# Patient Record
Sex: MALE
[De-identification: names, ages, dates, MRNs, and addresses within clinical notes are randomized per-mention and may not be internally consistent; named-entity substitution may affect disease eponyms.]

---

## 2020-12-31 PROBLEM — Z00.00 ENCOUNTER FOR PREVENTIVE HEALTH EXAMINATION: Status: ACTIVE | Noted: 2020-12-31

## 2021-09-17 ENCOUNTER — RESULT REVIEW (OUTPATIENT)
Age: 71
End: 2021-09-17

## 2024-10-29 ENCOUNTER — EMERGENCY (EMERGENCY)
Facility: HOSPITAL | Age: 74
LOS: 0 days | Discharge: HOME | End: 2024-10-29
Attending: STUDENT IN AN ORGANIZED HEALTH CARE EDUCATION/TRAINING PROGRAM
Payer: MEDICARE

## 2024-10-29 ENCOUNTER — EMERGENCY (EMERGENCY)
Facility: HOSPITAL | Age: 74
LOS: 0 days | Discharge: HOME | End: 2024-10-29
Attending: STUDENT IN AN ORGANIZED HEALTH CARE EDUCATION/TRAINING PROGRAM

## 2024-10-29 VITALS
HEART RATE: 62 BPM | TEMPERATURE: 98 F | OXYGEN SATURATION: 97 % | DIASTOLIC BLOOD PRESSURE: 79 MMHG | RESPIRATION RATE: 18 BRPM | SYSTOLIC BLOOD PRESSURE: 132 MMHG

## 2024-10-29 VITALS
OXYGEN SATURATION: 97 % | RESPIRATION RATE: 15 BRPM | SYSTOLIC BLOOD PRESSURE: 125 MMHG | TEMPERATURE: 98 F | DIASTOLIC BLOOD PRESSURE: 73 MMHG | HEART RATE: 55 BPM

## 2024-10-29 VITALS
HEART RATE: 70 BPM | WEIGHT: 145.06 LBS | DIASTOLIC BLOOD PRESSURE: 84 MMHG | OXYGEN SATURATION: 98 % | SYSTOLIC BLOOD PRESSURE: 137 MMHG | HEIGHT: 66 IN | RESPIRATION RATE: 70 BRPM | TEMPERATURE: 98 F

## 2024-10-29 VITALS
HEIGHT: 66 IN | WEIGHT: 145.06 LBS | DIASTOLIC BLOOD PRESSURE: 82 MMHG | RESPIRATION RATE: 19 BRPM | OXYGEN SATURATION: 98 % | SYSTOLIC BLOOD PRESSURE: 130 MMHG | TEMPERATURE: 98 F | HEART RATE: 57 BPM

## 2024-10-29 DIAGNOSIS — K21.9 GASTRO-ESOPHAGEAL REFLUX DISEASE WITHOUT ESOPHAGITIS: ICD-10-CM

## 2024-10-29 DIAGNOSIS — R04.0 EPISTAXIS: ICD-10-CM

## 2024-10-29 DIAGNOSIS — E78.5 HYPERLIPIDEMIA, UNSPECIFIED: ICD-10-CM

## 2024-10-29 DIAGNOSIS — I10 ESSENTIAL (PRIMARY) HYPERTENSION: ICD-10-CM

## 2024-10-29 LAB
BASOPHILS # BLD AUTO: 0.04 K/UL — SIGNIFICANT CHANGE UP (ref 0–0.2)
BASOPHILS NFR BLD AUTO: 0.5 % — SIGNIFICANT CHANGE UP (ref 0–2)
EOSINOPHIL # BLD AUTO: 0.05 K/UL — SIGNIFICANT CHANGE UP (ref 0–0.5)
EOSINOPHIL NFR BLD AUTO: 0.7 % — SIGNIFICANT CHANGE UP (ref 0–6)
HCT VFR BLD CALC: 43.9 % — SIGNIFICANT CHANGE UP (ref 39–50)
HGB BLD-MCNC: 14.9 G/DL — SIGNIFICANT CHANGE UP (ref 13–17)
IMM GRANULOCYTES NFR BLD AUTO: 0.3 % — SIGNIFICANT CHANGE UP (ref 0–0.9)
LYMPHOCYTES # BLD AUTO: 1.41 K/UL — SIGNIFICANT CHANGE UP (ref 1–3.3)
LYMPHOCYTES # BLD AUTO: 19.2 % — SIGNIFICANT CHANGE UP (ref 13–44)
MCHC RBC-ENTMCNC: 30 PG — SIGNIFICANT CHANGE UP (ref 27–34)
MCHC RBC-ENTMCNC: 33.9 G/DL — SIGNIFICANT CHANGE UP (ref 32–36)
MCV RBC AUTO: 88.3 FL — SIGNIFICANT CHANGE UP (ref 80–100)
MONOCYTES # BLD AUTO: 0.41 K/UL — SIGNIFICANT CHANGE UP (ref 0–0.9)
MONOCYTES NFR BLD AUTO: 5.6 % — SIGNIFICANT CHANGE UP (ref 2–14)
NEUTROPHILS # BLD AUTO: 5.4 K/UL — SIGNIFICANT CHANGE UP (ref 1.8–7.4)
NEUTROPHILS NFR BLD AUTO: 73.7 % — SIGNIFICANT CHANGE UP (ref 43–77)
NRBC # BLD: 0 /100 WBCS — SIGNIFICANT CHANGE UP (ref 0–0)
PLATELET # BLD AUTO: 261 K/UL — SIGNIFICANT CHANGE UP (ref 150–400)
RBC # BLD: 4.97 M/UL — SIGNIFICANT CHANGE UP (ref 4.2–5.8)
RBC # FLD: 13.2 % — SIGNIFICANT CHANGE UP (ref 10.3–14.5)
WBC # BLD: 7.33 K/UL — SIGNIFICANT CHANGE UP (ref 3.8–10.5)
WBC # FLD AUTO: 7.33 K/UL — SIGNIFICANT CHANGE UP (ref 3.8–10.5)

## 2024-10-29 PROCEDURE — 99284 EMERGENCY DEPT VISIT MOD MDM: CPT | Mod: 25

## 2024-10-29 PROCEDURE — 99283 EMERGENCY DEPT VISIT LOW MDM: CPT | Mod: 25

## 2024-10-29 PROCEDURE — 30903 CONTROL OF NOSEBLEED: CPT | Mod: RT,59

## 2024-10-29 PROCEDURE — 30901 CONTROL OF NOSEBLEED: CPT | Mod: RT,59

## 2024-10-29 PROCEDURE — 71045 X-RAY EXAM CHEST 1 VIEW: CPT | Mod: 26

## 2024-10-29 RX ORDER — CEPHALEXIN 500 MG
1 CAPSULE ORAL
Qty: 10 | Refills: 0
Start: 2024-10-29 | End: 2024-11-02

## 2024-10-29 RX ORDER — SILVER NITRATE
1 CRYSTALS MISCELLANEOUS ONCE
Refills: 0 | Status: COMPLETED | OUTPATIENT
Start: 2024-10-29 | End: 2024-10-29

## 2024-10-29 RX ADMIN — Medication 1 APPLICATION(S): at 10:06

## 2024-10-29 NOTE — ED PROVIDER NOTE - CLINICAL SUMMARY MEDICAL DECISION MAKING FREE TEXT BOX
74-year-old male with history of hypertension, hyperlipidemia and GERD presents today accompanied with his wife for evaluation for epistaxis, pt reports that he was bending over to get something which the bleeding started, describes it running out like an open faucet associate with coughing up clots, pt states that it lasted x 20 minutes, he denies dizziness/lightheadedness, headaches, weakness, chest pains or palpitations,  On exam pt is awake, alert and oriented x 3, well appearing, has dried blood on his chin. pt's right nostril turbinate is mildy swollen, +anterior irriation to the upper aspect. Pt denies nosepicking, recent cold instructions, prior to coming in he did call his niece who is a physician and instruced to pinch the bridge of his nose and look down which did help control his symptoms, pt's nostril cauterized, placed on the monitor for bp monitoring, cbc to check blood count, Will reasses and dispo 74-year-old male with history of hypertension, hyperlipidemia and GERD presents today accompanied with his wife for evaluation for epistaxis, pt reports that he was bending over to get something which the bleeding started, describes it running out like an open faucet associate with coughing up clots, pt states that it lasted x 20 minutes, he denies dizziness/lightheadedness, headaches, weakness, chest pains or palpitations,  On exam pt is awake, alert and oriented x 3, well appearing, has dried blood on his chin. pt's right nostril turbinate is mildy swollen, +anterior irriation to the upper aspect. Pt denies nosepicking, recent cold instructions, prior to coming in he did call his niece who is a physician and instruced to pinch the bridge of his nose and look down which did help control his symptoms, pt's nostril cauterized, placed on the monitor for bp monitoring, cbc to check blood count, Will reasses and dispo    cbc ordered, H/H stable, Hbg-14, right nostril cauterized  pt instructed to use saline sprays at home   follow up given with ENT, Dr Oconnell

## 2024-10-29 NOTE — ED ADULT TRIAGE NOTE - CHIEF COMPLAINT QUOTE
Came in for nosebleed from right nostril. On aspirin. No head trauma or fall. No active bleeding at this time. No pain or other complaints. PMH HTN, HLD.

## 2024-10-29 NOTE — ED PROVIDER NOTE - ENMT, MLM
Airway patent, right anterior nostril tenderness/redness, mild turbinate swelling, no active bleeding

## 2024-10-29 NOTE — ED ADULT NURSE NOTE - CHIEF COMPLAINT QUOTE
Came back for nosebleed. Was seen earlier this morning and was discharge, No trauma or fall. On aspirin. Bleeding stopped in triage. PMH HTN, HLD

## 2024-10-29 NOTE — ED ADULT NURSE NOTE - ED STAT RN HANDOFF DETAILS
Report endorsed to oncoming RN for my break coverage. Report given to covering RN and patient informed during rounding Safety checks compld this shift/Safety rounds completed hourly.  Fall +skin precs in place. Any issues endorsed to oncoming RN for follow up. RN Assumed patient's care for coverage. n

## 2024-10-29 NOTE — ED PROVIDER NOTE - CARE PROVIDER_API CALL
Savage Oconnell  Otolaryngology  [address] [place] [address], [place] [address]  Phone: [phone]  Fax: [phone]  Follow Up Time:

## 2024-10-29 NOTE — ED PROVIDER NOTE - CARE PROVIDER_API CALL
Savage Oconnell  Otolaryngology  [address] [place] [address], [place] [address]  Phone: [phone]  Fax: [phone]  Follow Up Time:

## 2024-10-29 NOTE — ED PROVIDER NOTE - NSFOLLOWUPINSTRUCTIONS_ED_ALL_ED_FT
Use saline nasal spray at home to keep your nostrils moist  Follow up with ENT  Return for worsening symptoms

## 2024-10-29 NOTE — ED ADULT NURSE NOTE - AS SC BRADEN NUTRITION
I was called to place an event monitor on this patient.  All discharge papers were in the system per RN for home care.  When I went up to the room, (4253) patient was coming out of the bathroom complaining of a severe headache and sweating.  I immediately went to the RN to report the patient's symptoms.  RN said she will call the doctor and get back to me regarding the monitor.    I called RN [phone] regarding status of the patient and monitor.  It was learned that the patient will be going to a rehab facility, ([facility]).    The arrangements for this facility are now being looked into.  RN is not sure of a timeframe of the discharge or it at all today.  RN will call us to let us know if the patient will be discharged today with the monitor.    Notes have been given on AM handoff to PM.  Monitor is still registered for the meantime.  
Patient was able to be placed at the rehab facility, ([facility]) today.  
RN called regarding patient around 1440.   Patient has refused the monitor.  I have contacted Lela to cancel the monitor.  I sent the monitor back to [employer].        Monitor # [id number].  
Spiritual Plan of Care    Pt Name: Sea Thompson  Pt : 1979  Date: February 10, Ean Lea Jr., M.Div.,     
(4) excellent

## 2024-10-29 NOTE — ED ADULT TRIAGE NOTE - ESI TRIAGE ACUITY LEVEL, MLM
Follow-up with your primary care doctor as needed  Take Tylenol or Motrin as needed for any pain or discomfort  Return if there is any worsening pain, swelling, or any new concerns.  
4

## 2024-10-29 NOTE — ED PROVIDER NOTE - PATIENT PORTAL LINK FT
You can access the FollowMyHealth Patient Portal offered by [facility] by registering at the following website: [email or web address]. By joining [employer]’s FollowMyHealth portal, you will also be able to view your health information using other applications (apps) compatible with our system.

## 2024-10-29 NOTE — ED ADULT NURSE NOTE - NS ED NOTE ABUSE RESPONSE YN
Yes
interpretation of diagnostic studies/additional history taking/direct patient care (not related to procedure)/documentation

## 2024-10-29 NOTE — ED ADULT TRIAGE NOTE - CHIEF COMPLAINT QUOTE
Came back for nosebleed. Was seen earlier this morning and was discharge, No trauma or fall. On aspirin. PMH HTN, HLD Came back for nosebleed. Was seen earlier this morning and was discharge, No trauma or fall. On aspirin. Bleeding stopped in triage. PMH HTN, HLD

## 2024-10-29 NOTE — ED ADULT NURSE REASSESSMENT NOTE - NS ED NURSE REASSESS COMMENT FT1
pt Undressed and waiting to be seen. NO active bleeding at this time.
Pt resting comfortably at this time. No s/s of pain noted. Cardiac monitor in progress
Cardiac monitor placed. Dr. Rogers completed silver nitrate. Family at bedside and updated on plan of care.

## 2024-10-29 NOTE — ED PROVIDER NOTE - CLINICAL SUMMARY MEDICAL DECISION MAKING FREE TEXT BOX
Patient EKG 74-year-old male with history of hypertension, hyperlipidemia and GERD presents today with his wife for evaluation epistaxis.  Patient was recently seen and discharged for the same complaint.  Pt was evaluated at that time, noted to have anterior nasal irritation, area was cleaned and cauterized, patient was observed, remained stable, no bleeding while in the ER and discharged given instructions to follow-up with ENT and use saline nasal sprays to prevent drynes, pt also made aware area may bleed again.  Once discharged, patient states that he started bleeding once he was outside,  denies sneezing other than his nose.  Denies headache chest pain shortness of breath on exam patient is alert and oriented on exam patient is alert and oriented x 3, has dried blood on his chin and holding a bucket which he states had blood but was taken and thrown away, no active bleeding at the bedside.  Again noted is redness and irritation of the anterior aspect of his right nostril. Rhinorocket placed, pt observed x 2 hours or more, no bleeding noted in either nostril. Outpatient referral given with Dr Oconnell

## 2024-10-29 NOTE — ED PROVIDER NOTE - NSFOLLOWUPINSTRUCTIONS_ED_ALL_ED_FT
Follow up with ENT, Dr Oconnell   [phone]    Keep your nasal packing in until you see Dr Oconnell  Return for worsening symptoms

## 2024-10-29 NOTE — ED ADULT NURSE NOTE - OBJECTIVE STATEMENT
Patient returned to ED for nosebleed, as per patient's daughter bloody nose returned to ED, actively bleeding denies any dizziness, headache.

## 2024-10-29 NOTE — ED PROVIDER NOTE - PATIENT PORTAL LINK FT
You can access the FollowMyHealth Patient Portal offered by [facility] by registering at the following website: [email or web address]. By joining [employer]’s FollowMyHealth portal, you will also be able to view your health information using other applications (apps) compatible with our system.

## 2024-10-31 ENCOUNTER — TRANSCRIPTION ENCOUNTER (OUTPATIENT)
Age: 74
End: 2024-10-31
